# Patient Record
Sex: FEMALE | Race: WHITE | Employment: FULL TIME | URBAN - METROPOLITAN AREA
[De-identification: names, ages, dates, MRNs, and addresses within clinical notes are randomized per-mention and may not be internally consistent; named-entity substitution may affect disease eponyms.]

---

## 2017-04-24 ENCOUNTER — HOSPITAL ENCOUNTER (OUTPATIENT)
Dept: PHYSICAL THERAPY | Age: 42
Discharge: HOME | End: 2017-04-24
Payer: COMMERCIAL

## 2017-04-24 PROCEDURE — 97110 THERAPEUTIC EXERCISES: CPT

## 2017-04-24 PROCEDURE — 97161 PT EVAL LOW COMPLEX 20 MIN: CPT

## 2017-04-24 PROCEDURE — 97530 THERAPEUTIC ACTIVITIES: CPT

## 2017-04-24 NOTE — PROGRESS NOTES
[facility] [address], [place], [address] [facility]  Phone: [phone]      Fax:  [phone]       Plan of Care/ Statement of Necessity for Physical Therapy Services      Patient name: Garry Cedeno Start of Care: 2017   Referral source: Devorah Jarrett MD : 1975    Medical Diagnosis: There are no admission diagnoses documented for this encounter. Onset Date:onset >5 years, worsening in last 1 year    Treatment Diagnosis: ese knee OA   Prior Hospitalization: see medical history Provider#: [id number]   Medications: Verified on Patient summary List    Comorbidities: BMI, DM   Prior Level of Function: pain with work, ADLs, walking/standing for last 5 years; works at [employer] 8-9 hours daily      [employer] of Care and following information is based on the information from the initial evaluation. Assessment/ key information: Pt is a 35 y.o. female presenting to therapy with ese knee pain and diagnosis of OA per MD script. Pt pain began >5 years ago, worsening following fall down the stairs 1 year ago. Pt impairments include limited patellar mobility, decreased knee AROM, decreased hip strength, altered gait and balance. Pt functional limitations include pain with stairs, walking, standing, bending and lifting for work. Pt treatment diagnosis consistent with ese knee OA. Pt would benefit from skilled PT to improve impairments listed above and improve function.    Evaluation Complexity History MEDIUM  Complexity : 1-2 comorbidities / personal factors will impact the outcome/ POC ; Examination HIGH Complexity : 4+ Standardized tests and measures addressing body structure, function, activity limitation and / or participation in recreation  ;Presentation LOW Complexity : Stable, uncomplicated  ;Clinical Decision Making MEDIUM Complexity : FOTO score of 26-74  Overall Complexity Rating: LOW   Problem List: pain affecting function, decrease ROM, decrease strength, impaired gait/ balance, decrease ADL/ functional abilitiies, decrease activity tolerance, decrease flexibility/ joint mobility and decrease transfer abilities   Treatment Plan may include any combination of the following: Therapeutic exercise, Therapeutic activities, Neuromuscular re-education, Physical agent/modality, Gait/balance training, Manual therapy, Patient education and Functional mobility training  Patient / Family readiness to learn indicated by: asking questions, trying to perform skills and interest  Persons(s) to be included in education: patient (P) and son  Barriers to Learning/Limitations: None  Patient Goal (s): My biggest goal is to be able to go through a day and be able to manage with pain is minimal. My goal is to not have to take my meds  Patient Self Reported Health Status: fair  Rehabilitation Potential: fair    Short Term Goals: STG- To be accomplished in 2 week(s):  1. Pt will be independent with HEP to encourage prophylaxis. Eval:dispensed  Current: NA     Long Term Goals: LTG- To be accomplished in 6 week(s):  1. Pt will demonstrate good patellar mobility in all directions to allow to to bend and lift with less pain at work. Eval:poor mobility in all directions ese  Current: NA     2. Pt will be able to perform SLS for 30 sec ese on floor with no error to improve community ambulation for errands. Eval:SLS: 5 errors in 25 sec on right LE, 6+ errors in 16 sec on left  Current: NA     3. Pt hip strength will improve to 4+/5 or greater to allow pt to go up and down stairs with no more than 3/10 pain to improve home access. Eval:[] Unable to assess Strength (1-5)        Left Right   Hip Flexion 4 pain  4 pain      Extension 3+ 3+      Abduction 4- 4      Adduction 4 4   Knee Flexion 4  4- in prone 4  4- in prone      Extension 4 4   Ankle Plantarflexion 5 5      Dorsiflexion 5 5         Current: NA     4.  Pt FOTO score will increase by >10 points to show improvement in ese knee function. Eval:35  Current: will address at visit 5  Frequency / Duration: Patient to be seen 2 times per week for 6 weeks. Patient/ Caregiver education and instruction: Diagnosis, prognosis, self care, activity modification and exercises   [x]  Plan of care has been reviewed with MIGUELANGEL Treviño PT 4/24/2017 11:15 AM  _____________________________________________________________________  I certify that the above Therapy Services are being furnished while the patient is under my care. I agree with the treatment plan and certify that this therapy is necessary.     Physician's Signature:____________________  Date:__________Time:______    Please sign and return to [facility] the [address], [address] [facility]       Phone: [phone]      Fax:  [phone]

## 2017-04-27 ENCOUNTER — APPOINTMENT (OUTPATIENT)
Dept: PHYSICAL THERAPY | Age: 42
End: 2017-04-27
Payer: COMMERCIAL

## 2017-04-28 ENCOUNTER — HOSPITAL ENCOUNTER (OUTPATIENT)
Dept: PHYSICAL THERAPY | Age: 42
Discharge: HOME | End: 2017-04-28
Payer: COMMERCIAL

## 2017-04-28 PROCEDURE — 97530 THERAPEUTIC ACTIVITIES: CPT

## 2017-04-28 PROCEDURE — 97110 THERAPEUTIC EXERCISES: CPT

## 2017-04-28 NOTE — PROGRESS NOTES
PT DAILY TREATMENT NOTE     Patient Name: Eusebio Miner  Date:2017  : 1975  [x]  Patient  Verified  Payor: BLUE CROSS / Plan: [facility] PPO / Product Type: PPO /    In time:10:00  Out time:10:46  Total Treatment Time (min): 55  Visit #: 2 of 12    Treatment Area: Right leg pain [M79.604]  Left leg pain [M79.605]    SUBJECTIVE  Pain Level (0-10 scale): 4/10  Any medication changes, allergies to medications, adverse drug reactions, diagnosis change, or new procedure performed?: [x] No    [] Yes (see summary sheet for update)  Subjective functional status/changes:   [] No changes reported  \"As long I don't bend my knees they don't really hurt much. \"    OBJECTIVE      26 min Therapeutic Exercise:  [x] See flow sheet :   Rationale: increase ROM, increase strength and improve coordination to improve the patients ability to perform ADLs with less pain. 20 min Therapeutic Activity:  [x]  See flow sheet :   Rationale: increase ROM, increase strength and improve coordination  to improve the patients ability to perform ADLs with less pain. With   [] TE   [] TA   [] neuro   [] other: Patient Education: [x] Review HEP    [] Progressed/Changed HEP based on:   [] positioning   [] body mechanics   [] transfers   [] heat/ice application    [] other:      Other Objective/Functional Measures:      Pain Level (0-10 scale) post treatment: 0/10    ASSESSMENT/Changes in Function: Pt tolerated ex well with no increased pain with ex. Pt was mildly challenged by ex. Pt denied modalities post tx. Patient will continue to benefit from skilled PT services to modify and progress therapeutic interventions, address functional mobility deficits, address ROM deficits, analyze and address soft tissue restrictions, analyze and cue movement patterns, analyze and modify body mechanics/ergonomics and assess and modify postural abnormalities to attain remaining goals.      []  See Plan of Care  [] See progress note/recertification  []  See Discharge Summary         Progress towards goals / Updated goals:  Short Term Goals: STG- To be accomplished in 2 week(s):  1. Pt will be independent with HEP to encourage prophylaxis. Eval:dispensed  Current: pt attempted ex      Long Term Goals: LTG- To be accomplished in 6 week(s):  1. Pt will demonstrate good patellar mobility in all directions to allow to to bend and lift with less pain at work. Eval:poor mobility in all directions ese  Current: NA      2. Pt will be able to perform SLS for 30 sec ese on floor with no error to improve community ambulation for errands. Eval:SLS: 5 errors in 25 sec on right LE, 6+ errors in 16 sec on left  Current: NA      3. Pt hip strength will improve to 4+/5 or greater to allow pt to go up and down stairs with no more than 3/10 pain to improve home access. Eval:[] Unable to assess Strength (1-5)        Left Right   Hip Flexion 4 pain  4 pain      Extension 3+ 3+      Abduction 4- 4      Adduction 4 4   Knee Flexion 4  4- in prone 4  4- in prone      Extension 4 4   Ankle Plantarflexion 5 5      Dorsiflexion 5 5           Current: NA      4. Pt FOTO score will increase by >10 points to show improvement in ese knee function.   Eval:35  Current: will address at visit 5      PLAN  []  Upgrade activities as tolerated     [x]  Continue plan of care  []  Update interventions per flow sheet       []  Discharge due to:_  []  Other:_      Mariel Salines 4/28/2017  10:50 AM    Future Appointments  Date Time Provider [place]   5/2/2017 7:30 AM Jazzy Graf, PT, DPT [facility]   5/4/2017 7:30 AM Jazzy Graf, PT, DPT [facility]   5/9/2017 7:30 AM Jazzy Graf, PT, DPT [facility]   5/11/2017 7:30 AM Jazzy Graf, PT, DPT [facility]   5/16/2017 7:30 AM Mariel Ochoa [facility]   5/18/2017 7:30 AM KONG RosaHPIWONA [facility]   5/23/2017 7:30 AM Mariel FAUST [facility]   5/25/2017 7:30 AM Guadalupe Hidalgo PT [id number] 5/30/2017 7:30 AM Jennifer FAUST [facility]   6/1/2017 7:30 AM Tora Scheuermann Duayne Paris, PT [facility]   6/6/2017 7:30 AM Tora Scheuermann Duayne Paris, PT [facility]   6/8/2017 7:30 AM Tora Scheuermann Duayne Paris, PT [facility]

## 2017-05-02 ENCOUNTER — APPOINTMENT (OUTPATIENT)
Dept: PHYSICAL THERAPY | Age: 42
End: 2017-05-02

## 2017-05-04 ENCOUNTER — APPOINTMENT (OUTPATIENT)
Dept: PHYSICAL THERAPY | Age: 42
End: 2017-05-04

## 2017-05-11 ENCOUNTER — APPOINTMENT (OUTPATIENT)
Dept: PHYSICAL THERAPY | Age: 42
End: 2017-05-11

## 2017-05-16 ENCOUNTER — APPOINTMENT (OUTPATIENT)
Dept: PHYSICAL THERAPY | Age: 42
End: 2017-05-16

## 2017-05-18 ENCOUNTER — APPOINTMENT (OUTPATIENT)
Dept: PHYSICAL THERAPY | Age: 42
End: 2017-05-18

## 2017-05-23 ENCOUNTER — APPOINTMENT (OUTPATIENT)
Dept: PHYSICAL THERAPY | Age: 42
End: 2017-05-23

## 2017-05-25 ENCOUNTER — APPOINTMENT (OUTPATIENT)
Dept: PHYSICAL THERAPY | Age: 42
End: 2017-05-25

## 2017-05-30 ENCOUNTER — APPOINTMENT (OUTPATIENT)
Dept: PHYSICAL THERAPY | Age: 42
End: 2017-05-30

## 2017-06-01 ENCOUNTER — APPOINTMENT (OUTPATIENT)
Dept: PHYSICAL THERAPY | Age: 42
End: 2017-06-01

## 2017-06-01 NOTE — PROGRESS NOTES
[facility] [address], [place], [address] [facility]  Phone: [phone]      Fax:  [phone]    Discharge Summary    Patient name: Aris Pozo     Start of Care: 17  Referral source: Shahrzad Lange MD    : 1975  Medical/Treatment Diagnosis: Right leg pain [M79.604]  Left leg pain [M79.605]  Onset Date:onset >5 years, worsening in last 1 year  Prior Hospitalization: see medical history   Provider#: [id number]  Comorbidities: BMI, DM  Prior Level of Function: pain with work, ADLs, walking/standing for last 5 years; works at [employer] 8-9 hours daily      Medications: Verified on Patient Summary List    Visits from [facility]: 2    Missed Visits: 3+  Reporting Period : 17 to 17        Assessment/ Summary of Care: Physical therapy consisted of exercises to target ese knee OA and improve function. Treatment strategies included therapeutic exercises, therapeutic activity, neuromuscular re-education, patient education to improve tolerance to everyday activities and ADLs. Pt performed fairly well in therapy but only attended IE and 1 follow up appointment. No progress toward goals due to non-compliance. Due to non-compliance, pt to be discharged at this time.      RECOMMENDATIONS:  [x]Discontinue therapy: []Patient has reached or is progressing toward set goals      [x]Patient is non-compliant or has abdicated      []Due to lack of appreciable progress towards set goals    Keith Mann, PT 2017 2:18 PM

## 2017-06-06 ENCOUNTER — APPOINTMENT (OUTPATIENT)
Dept: PHYSICAL THERAPY | Age: 42
End: 2017-06-06

## 2017-06-08 ENCOUNTER — APPOINTMENT (OUTPATIENT)
Dept: PHYSICAL THERAPY | Age: 42
End: 2017-06-08

## 2017-10-05 ENCOUNTER — HOSPITAL ENCOUNTER (OUTPATIENT)
Dept: LAB | Age: 42
Discharge: HOME | End: 2017-10-05

## 2017-10-05 LAB — RUBV IGG SER-IMP: NORMAL

## 2017-10-05 PROCEDURE — 86765 RUBEOLA ANTIBODY: CPT | Performed by: EMERGENCY MEDICINE

## 2017-10-05 PROCEDURE — 86735 MUMPS ANTIBODY: CPT | Performed by: EMERGENCY MEDICINE

## 2017-10-05 PROCEDURE — 86787 VARICELLA-ZOSTER ANTIBODY: CPT | Performed by: EMERGENCY MEDICINE

## 2017-10-05 PROCEDURE — 86706 HEP B SURFACE ANTIBODY: CPT | Performed by: EMERGENCY MEDICINE

## 2017-10-05 PROCEDURE — 86762 RUBELLA ANTIBODY: CPT | Performed by: EMERGENCY MEDICINE

## 2017-10-06 LAB
HBV SURFACE AB SER QL IA: NEGATIVE
HBV SURFACE AB SERPL IA-ACNC: <3.1 MIU/ML
HEP BS AB COMMENT,HBSAC: ABNORMAL
MEV IGG SER IA-ACNC: 66.4 AU/ML
MUV IGG SER IA-ACNC: <9 AU/ML
VZV IGG SER IA-ACNC: 423 INDEX

## 2020-03-23 ENCOUNTER — NURSE TRIAGE (OUTPATIENT)
Dept: OTHER | Facility: CLINIC | Age: 45
End: 2020-03-23

## 2020-03-23 NOTE — TELEPHONE ENCOUNTER
Reason for Disposition   Caller has already spoken with the PCP (or office), and has no further questions    Protocols used: NO CONTACT OR DUPLICATE CONTACT CALL-ADULT-[place]    Call not triaged. Pt calling on COVID line. Pt is [employer] employee. States she was instructed to call to set up leave of absence. States she saw PCP (Dr. Deb Gutierrez) on Tuesday, she was diagnosed with an URI. She states symptoms are worsening. She states PCP called in abx today. PCP informed pt, he would advise pt to stay home until URI symptoms are resolved. Pt c/o cough, nasal drainage, and sore throat. No fever greater then 99. He recommended her staying off work until her symptoms resolved. Provided pt with leave of absence information. Pt verbalized understanding. She denies any other questions or concerns. Please do not respond to the triage nurse through this encounter. Any subsequent communication should be directly with the patient.

## 2020-09-02 ENCOUNTER — EMPLOYEE WELLNESS (OUTPATIENT)
Dept: FAMILY MEDICINE CLINIC | Age: 45
End: 2020-09-02

## 2020-09-02 LAB
CHOLEST SERPL-MCNC: 122 MG/DL
GLUCOSE SERPL-MCNC: 122 MG/DL (ref 74–99)
HDLC SERPL-MCNC: 30 MG/DL (ref 40–60)
LDLC SERPL CALC-MCNC: 53.6 MG/DL (ref 0–100)
TRIGL SERPL-MCNC: 192 MG/DL (ref ?–150)

## 2020-09-03 LAB
EST. AVERAGE GLUCOSE BLD GHB EST-MCNC: 134 MG/DL
HBA1C MFR BLD: 6.3 % (ref 4.2–5.6)